# Patient Record
Sex: FEMALE | Race: WHITE | NOT HISPANIC OR LATINO | Employment: PART TIME | URBAN - METROPOLITAN AREA
[De-identification: names, ages, dates, MRNs, and addresses within clinical notes are randomized per-mention and may not be internally consistent; named-entity substitution may affect disease eponyms.]

---

## 2017-01-10 ENCOUNTER — OFFICE VISIT (OUTPATIENT)
Dept: OPHTHALMOLOGY | Facility: CLINIC | Age: 67
End: 2017-01-10
Payer: COMMERCIAL

## 2017-01-10 DIAGNOSIS — E11.3519 PROLIFERATIVE DIABETIC RETINOPATHY WITH MACULAR EDEMA ASSOCIATED WITH TYPE 2 DIABETES MELLITUS, UNSPECIFIED LATERALITY: ICD-10-CM

## 2017-01-10 DIAGNOSIS — Z98.890 HISTORY OF VITRECTOMY: ICD-10-CM

## 2017-01-10 DIAGNOSIS — H40.003 GLAUCOMA SUSPECT, BILATERAL: ICD-10-CM

## 2017-01-10 DIAGNOSIS — Z98.890 POST-OPERATIVE STATE: Primary | ICD-10-CM

## 2017-01-10 DIAGNOSIS — H52.7 REFRACTIVE ERROR: ICD-10-CM

## 2017-01-10 PROCEDURE — 99024 POSTOP FOLLOW-UP VISIT: CPT | Mod: S$GLB,,, | Performed by: OPHTHALMOLOGY

## 2017-01-10 PROCEDURE — 92134 CPTRZ OPH DX IMG PST SGM RTA: CPT | Mod: S$GLB,,, | Performed by: OPHTHALMOLOGY

## 2017-01-10 PROCEDURE — 99999 PR PBB SHADOW E&M-EST. PATIENT-LVL II: CPT | Mod: PBBFAC,,, | Performed by: OPHTHALMOLOGY

## 2017-01-10 NOTE — PROGRESS NOTES
HPI     Post-op Evaluation    Additional comments: 1 month s/p phaco iol OS 11/30/16           Comments   DLS: 12/6/16    Pt states vision in OS doing much better since last visit.     Gtts: Brimonidine TID OU       Last edited by Cheryle Quintana on 1/10/2017  2:50 PM. (History)            Assessment /Plan     For exam results, see Encounter Report.    Post-operative state    Glaucoma suspect, bilateral  -     [place] Retina Tomography (HRT) - OU - Both Eyes; Future  -     [place] Visual Field - OU - Extended - Both Eyes; Future    History of vitrectomy    Proliferative diabetic retinopathy with macular edema associated with type 2 diabetes mellitus, unspecified laterality    Refractive error            Nuclear sclerosis, bilateral - 1 month s/p Phaco/PCIOL OS with I-ring. Doing well.   s/p Phaco/PCIOL OD with Malyugen ring. IOP good on Brimonidine. Doing well.     Glaucoma suspect, bilateral - IOP appears stable. Possibly was having some symptoms of intermittent angle closure - now psedophakic. Possible further increase in C:D ratio on last DFE. Unable to get a good enough HRT image to trace - cataract likely interfering. No focal thinning was identified. Last HVF defects suggestive of possible CVA, but MRI brain was negative. Brimonidine started BID OD on 4/21/16, increased to TID perioperatively on 7/12/16. Will go back down to BID on Brimonidine now OU 1/10/17. RTC 3 months for HVF and HRT with IOP check.    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 Doing well.    Proliferative diabetic retinopathy with macular edema associated with type 2 diabetes mellitus - s/p PRP OD; focal OU; Avastin OD with PPV - follow up with Dr. Caceres. May limit final visual acuity.     Anatomical narrow angle, bilateral - steep approach last gonio before CE; OD now open to SS+    Refractive error - target emmetropia; MRx given for prn

## 2017-01-10 NOTE — MR AVS SNAPSHOT
[facility]  [address] [facility] [address]  [facility] [address]  Phone: [phone]  Fax: [phone]                  Leatha Soliman   1/10/2017 3:00 PM   Office Visit    Description:  Female : 1950   Provider:  Shavonne Fonseca MD   Department:  [facility]           Reason for Visit     Post-op Evaluation           Diagnoses this Visit        Comments    Post-operative state    -  Primary     Glaucoma suspect, bilateral         History of vitrectomy         Proliferative diabetic retinopathy with macular edema associated with type 2 diabetes mellitus, unspecified laterality         Refractive error                To Do List           Future Appointments        Provider Department Dept Phone    3/6/2017 8:00 AM LAB, [place] [facility] [phone]    3/6/2017 8:30 AM Kwabena Leiva DPM [facility] Podiatry [phone]    3/20/2017 9:00 AM Pj Bowling MD [facility] Nephrology [phone]    3/20/2017 10:40 AM Harlan Miller MD [facility] Cardiology [phone]    3/20/2017 11:45 AM [facility] MAMMO1 [facility] [phone]      Goals (5 Years of Data)              16    Blood Pressure < 140/90     158/77  162/77    BMI is less than 25           HEMOGLOBIN A1C < 7.0   8.0          Follow-Up and Disposition     Return in about 3 months (around 4/10/2017) for HVF, HRT, IOP check; 1-2 months Dr. Caceres.      [facility]     [facility] Care [facility] -  Assistance  Registered nurses in the [facility] provide clinical advisement, health education, appointment booking, and other advisory services.  Call for this free service at [phone].             Medications           Message regarding Medications     Verify the changes and/or additions to your medication regime listed below are the same as discussed with your clinician today.  If any of these changes  or additions are incorrect, please notify your healthcare provider.        STOP taking these medications     ketorolac 0.5% (ACULAR) 0.5 % Drop Place 1 drop into the left eye 4 (four) times daily. Start 1 week before surgery.    moxifloxacin (VIGAMOX) 0.5 % ophthalmic solution Place 1 drop into the left eye 4 (four) times daily. Start 1 day before cataract surgery.    prednisoLONE acetate (PRED FORTE) 1 % DrpS Place 1 drop into the left eye 4 (four) times daily. Start on day of surgery.           Verify that the below list of medications is an accurate representation of the medications you are currently taking.  If none reported, the list may be blank. If incorrect, please contact your healthcare provider. Carry this list with you in case of emergency.           Current Medications     aspirin 325 MG tablet Take 325 mg by mouth once daily.      blood sugar diagnostic Strp Please Dispense Free Style Test Strips. Use 4 times daily    brimonidine 0.2% (ALPHAGAN) 0.2 % Drop Place 1 drop into both eyes 3 (three) times daily.    carvedilol (COREG) 6.25 MG tablet Take 1 tablet (6.25 mg total) by mouth 2 (two) times daily with meals.    clopidogrel (PLAVIX) 75 mg tablet Take 1 tablet (75 mg total) by mouth once daily.    colesevelam (WELCHOL) 3.75 gram PwPk Take 3.75 g by mouth once daily. Override medically necessary.  Pt intolerant of all statins. 359.4, 272.4    insulin aspart (NOVOLOG PENFILL) 100 unit/mL Crtg Inject into the skin. 10 units in AM, 5 units at lunch, 10 units at dinner    insulin glargine (LANTUS SOLOSTAR) 100 unit/mL (3 mL) InPn pen Inject 35 Units into the skin every evening. 90 days    isosorbide mononitrate (IMDUR) 60 MG 24 hr tablet Take 1 tablet (60 mg total) by mouth once daily.    losartan (COZAAR) 100 MG tablet Take 100 mg by mouth once daily.    nitroGLYCERIN (NITROSTAT) 0.4 MG SL tablet Place 0.4 mg under the tongue every 5 (five) minutes as needed for Chest pain.    pen needle, diabetic (PEN  "NEEDLE) 31 gauge x 1/4" Ndle Use with insulin pen needles QID           Clinical Reference Information           Vital Signs - Last Recorded     LMP                   04/29/2000           Allergies as of 1/10/2017     Oxycodone      Immunizations Administered on Date of Encounter - 1/10/2017     None      Orders Placed During Today's Visit     Future Labs/Procedures Expected by Expires    [place] Retina Tomography (HRT) - OU - Both Eyes  As directed 1/10/2018    [place] Visual Field - OU - Extended - Both Eyes  As directed 1/10/2018      "

## 2017-03-24 ENCOUNTER — PATIENT OUTREACH (OUTPATIENT)
Dept: ADMINISTRATIVE | Facility: HOSPITAL | Age: 67
End: 2017-03-24

## 2017-03-24 DIAGNOSIS — E11.9 TYPE 2 DIABETES MELLITUS WITHOUT COMPLICATION: ICD-10-CM

## 2017-03-24 NOTE — PROGRESS NOTES
An a1c bulk order was pended for this patient.    Last OV with Dr. Lopez 11/2016.  Due for an office visit with her, some labs for your diabetes, a urine test for your kidneys, and a mammogram    Letter/Mychart message sent to notify patient.    cmp doen 12/2016 - anything you'd like to add?

## 2017-03-24 NOTE — LETTER
April 4, 2017    Leatha Soliman  [address]  [facility] [address]             [facility]  [address]  [facility] [address]  Phone: [phone] Dear Mrs. Soliman:    We have tried to reach you by mychart unsuccessfully.    [facility] is committed to your overall health.  To help you get the most out of each of your visits, we will review your information to make sure you are up to date on all of your recommended tests and/or procedures.  We have Dr. Dulce Lopez listed as your primary care provider.     She has found that you may be due for an office visit with her, some labs for your diabetes, a urine test for your kidneys, and a mammogram.     If you have had any of the above done at an outside facility, please let us know so I can update your record.  If you have a copy of these records, please provide a copy for us to scan into your chart.  If not, please provide that provider/facilities contact information so that we may obtain copies from that facility.     Otherwise, please schedule these appointments at your earliest convenience.     If you have any questions or concerns, please don't hesitate to call.    Thank you for letting us care for you,  Fátima Castro LPN Clinical Care Coordinator  [facility]  [phone]

## 2017-04-05 ENCOUNTER — PATIENT MESSAGE (OUTPATIENT)
Dept: FAMILY MEDICINE | Facility: CLINIC | Age: 67
End: 2017-04-05

## 2017-06-26 ENCOUNTER — TELEPHONE (OUTPATIENT)
Dept: OPHTHALMOLOGY | Facility: CLINIC | Age: 67
End: 2017-06-26